# Patient Record
Sex: MALE | Race: BLACK OR AFRICAN AMERICAN
[De-identification: names, ages, dates, MRNs, and addresses within clinical notes are randomized per-mention and may not be internally consistent; named-entity substitution may affect disease eponyms.]

---

## 2017-01-06 ENCOUNTER — HOSPITAL ENCOUNTER (EMERGENCY)
Dept: HOSPITAL 18 - NAV ERS | Age: 42
Discharge: HOME | End: 2017-01-06
Payer: SELF-PAY

## 2017-01-06 DIAGNOSIS — S09.90XA: Primary | ICD-10-CM

## 2017-01-06 DIAGNOSIS — S01.01XA: ICD-10-CM

## 2017-01-06 DIAGNOSIS — F17.210: ICD-10-CM
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
## 2017-01-06 DIAGNOSIS — Z90.49: ICD-10-CM

## 2017-01-06 PROCEDURE — 70450 CT HEAD/BRAIN W/O DYE: CPT

## 2017-01-06 PROCEDURE — 87070 CULTURE OTHR SPECIMN AEROBIC: CPT

## 2017-01-06 PROCEDURE — 87205 SMEAR GRAM STAIN: CPT

## 2017-01-06 NOTE — ERRECORD
[facility]

                                EMERGENCY RECORD



HPI WOUND CHECK (18:29 SHAN)

CHIEF COMPLAINT: Patient presents for evaluation of scalp

      wound, laceration. HISTORIAN: History

      provided by patient, 2 by 4 fell aprox. 20 feet and struck him

      in the head; he relates that he was unconscious for several minutes;

      was seen at an urgent care in [place]. Injury occurred on the job. Had

      marked headache yesterday. Five sutures were placed in the wound.

      Today had more swelling and mild exudate, came in to recheck.

      TIME COURSE: Sudden onset of symptoms.

      ASSOCIATED WITH: No associated symptoms.



ROS (18:31 SHAN)

CONSTITUTIONAL: Negative constitutional review of systems.

EYES: Negative eye review of systems.

ENT: Negative ears, nose, throat review of systems.

CARDIOVASCULAR: Negative cardiovascular review of systems.

RESPIRATORY: Negative respiratory review of systems.

GI: Negative gastrointestinal review of systems.

MUSCULOSKELETAL: Negative musculoskeletal review of systems.

SKIN: Negative skin review of systems, laceration on left

      scalp.

NEUROLOGIC: Negative neurologic review of systems.

NOTES: All systems reviewed, negative except as described above.



PAST MEDICAL HISTORY (18:17 MSPE)

MEDICAL HISTORY:  No past medical history, Flu vaccine not

      up to date, Tetanus immunization up to date, Pneumococcal vaccine not

      up to date.

MALE SURGICAL HISTORY:  Surgical history of

      appendectomy.

PSYCHIATRIC HISTORY:  No previous psychiatric history.

SOCIAL HISTORY: Patient drinks socially, Patient denies drug use,

      Patient currently uses tobacco, smokes cigarettes, sts

      one pack every two wks.



KNOWN ALLERGIES

aspirin

No Known Allergies (Unconfirmed)



CURRENT MEDICATIONS (18:15 MSPE)

None



VITAL SIGNS

VITAL SIGNS: BP: 157/106, Pulse: 87, Resp: 18, Temp: 98.6 (Oral),

      Pain: 5, O2 sat: 98 on Room Air, Time: 2017 18:15. (18:15 MSPE)

  BP: 134/95, Pulse: 80, Resp: 16, Time: 2017 18:46. (18:46 MSPE)



PHYSICAL EXAM (18:31 SHAN)

CONSTITUTIONAL: Patient afebrile, Pulse normal, Blood pressure

      normal, Respiratory rate normal, Normal pulse oximetry, Patient



&a-1R&a+25V*p+0X*e3588B*c202B*c15G*c2P*p-0X&a-25V&a+1R          Name: Dharmesh Bailey  : 1975 M41 MedRec: [id number]

                             AcctNum: [id number]

  Prepared:  19:33 by Interface                 Page 1 of 3

                                      pMD

                         [facility]

                                EMERGENCY RECORD



      appears non toxic, Patient appears pain free, Patient alert and

      oriented to person, place and time, Nursing notes reviewed.

HEAD: Head exam included findings of, normocephalic,

      5 cm laceration with mild exudate on left occipital area.

      culture done. mild swelling.

EYES: Eye exam included findings of eyelids normal to inspection,

      Pupils equally round and reactive to light, Extraocular muscles

      intact.

ENT: Pharynx exam normal, Uvula exam normal, Tonsil exam normal.

NECK: Neck exam included findings of normal range of motion,

      Trachea midline.

RESPIRATORY CHEST: Respiratory and chest exam normal.

CARDIOVASCULAR: Cardiovascular exam included findings of heart

      rate regular rate and rhythm, Heart sounds normal.

ABDOMEN MALE: Abdominal exam included findings of abdomen

      nontender, Bowel sounds normal.

BACK: Back exam normal.

UPPER EXTREMITY: Upper extremity exam included findings of

      inspection normal, Range of motion normal.

NEURO: Neuro exam normal.

SKIN: Skin exam normal.



MEDICATION ADMINISTRATION SUMMARY



      Drug Name: Keflex, Dose Ordered: 2 cap(s), Route: Oral, Status:

      Given, Time: 18:52 2017, Detailed record available in Medication

      Service section.



DOCTOR NOTES (19:17 SHAN)

TEXT:  Question of early cellulitis; culture done; Keflex

      started, ct of head good.



PROBLEM LIST

  No recorded problems



DIAGNOSIS (19:19 SHAN)

FINAL: PRIMARY: scalp laceration followup, ADDITIONAL:

      closed head injury; 1 day old.



PRESCRIPTION (18:54 SHAN)

Keflex:  CAPSULE : 250 mg : ORAL : Quantity: *** 1 *** Unit:

      cap(s) Route: ORAL Schedule: 4 times a day Dispense: *** 28 *** Unit:

      cap(s)

      May substitute. Refills: *** No Refills ***.

NOTES:  No Refills.



DISPOSITION

PATIENT:  Disposition Type: Discharge, Disposition: *Discharge

      Home. (19:19 SHAN)

   Patient left the department. (19:29 EPIE)



&a-1R&a+25V*p+0X*y5075I*c202B*c15G*c2P*p-0X&a-25V&a+1R          Name: Dharmesh Bailey  : 1975 11 MedRec: [id number]

                             AcctNum: [id number]

  Prepared:  19:33 by Interface                 Page 2 of 3

                                      pMD

                         [facility]

                                EMERGENCY RECORD



Sandoval:

  NARCISO=SG Riggins, Damaris ALBA=SG Morrell, Cherri TUCKER=MD Janae, Rafael



































































































&a-1R&a+25V*p+0X*h1281O*c202B*c15G*c2P*p-0X&a-25V&a+1R          Name: Dharmesh Bailey  : 1975 11 MedRec: [id number]

                             AcctNum: [id number]

  Prepared:  19:33 by Interface                 Page 3 of 3

                                      pMD

MTDD

## 2017-01-06 NOTE — PICIS
[facility]

                                EMERGENCY RECORD



TRIAGE ( 18:14 MSPE)

TRIAGE NOTES:  head wound check with headache. ( 18:14 MSPE)

PATIENT: NAME: Dharmesh Bailey, AGE: 41, GENDER: male, : Tue

      Oct 04, 1975, TIME OF GREET:  18:11, PREFERRED

      LANGUAGE: English, ETHNICITY: Not  or , ECODE BILLING

      MAP: [facility], SSN: [id number], Zip Code: [address], [place]

      WEIGHT: 65.77, PHONE: [phone], MEDICAL RECORD NUMBER:

      [id number], ACCOUNT NUMBER: [id number], PERSON ID: [id number], PCP:

      none. ( 18:14 MSPE)

COMPLAINT: HIGH RISK COMPLAINT: HEAD INJURY. (

      18:14 MSPE)

ADMISSION: URGENCY: 4 Non Urgent, ADMISSION SOURCE: Home,

      TRANSPORT: CAR, BED: ER -04. ( 18:14 MSPE)

TREATMENTS IN PROGRESS: Treatments given Prehospital: none today.

      (18:17 MSPE)

PROVIDERS: TRIAGE NURSE: Cherri Morrell RN. (

      18:14 MSPE)

VITAL SIGNS: /106, Pulse 87, Resp 18, Temp 98.6, (Oral),

      Pain 5, O2 Sat 98, on Room Air, Time 2017 18:15. (18:15 MSPE)

PREVIOUS VISIT ALLERGIES: aspirin. ( 18:14 MSPE)

  aspirin. (18:17 MSPE)



KNOWN ALLERGIES

aspirin

No Known Allergies (Unconfirmed)



CURRENT MEDICATIONS (18:15 MSPE)

None



VITAL SIGNS

VITAL SIGNS: BP: 157/106, Pulse: 87, Resp: 18, Temp: 98.6 (Oral),

      Pain: 5, O2 sat: 98 on Room Air, Time: 2017 18:15. (18:15 MSPE)

  BP: 134/95, Pulse: 80, Resp: 16, Time: 2017 18:46. (18:46 MSPE)



NURSING ASSESSMENT: FOCUSED (18:20 MSPE)

CONSTITUTIONAL: Patient arrives ambulatory, Gait steady, History

      obtained from patient, Patient appears, anxious, Patient

      cooperative, Patient alert, Oriented to person, place and time, Skin

      warm, Skin dry.

PAIN:  c/o headache.

EYES: Focused eye assessment finding include pupils equally round

      and reactive to light.

NEURO: Focused neuro assessment findings include patient alert,

      cooperative, Speech coherent.

GCS: Eye opening: (4) - Spontaneous, Verbal: (5) -

      Oriented/conversive, Motor: (6) - Obeys commands/Spontaneous, GCS

      Total: 15.

RESPIRATORY: Notes: no resp distress.

ABDOMEN: no complaint of nausea.



&a-1R&a+25V*p+0X*n7510M*c202B*c15G*c2P*p-0X&a-25V&a+1R          Name: Dharmesh Bailey  : 1975 M41 MedRec: [id number]

                             AcctNum: [id number]

  Prepared:  19:39 by Interface                 Page 1 of 5

                                      pMD

                         [facility]

                                EMERGENCY RECORD



GENITOURINARY: Focused genitourinary assessment not applicable.

MUSCULOSKELETAL: Focused musculoskeletal assessment findings

      include normal range of motion.

LACERATION: Notes: approx. 3cm sutured laceration to top of

      head. Sutures intact. Scant amt of drainage noted. Pt reports a 2x4

      fell approx. 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 Was treated at medical

      facility near job site (in [facility]) Concerned today that wound

      "may need more stitches" and has had a persistent HA today. Denies

      N/V. Denies neck pain.



NURSING PROCEDURE: DISCHARGE NOTE (19:26 EPIE)

DISCHARGE: Patient discharged to home, ambulating without

      assistance, driving self, accompanied by other family member, Summary

      of Care printed/ provided, Discharge instructions given to patient,

      Simple or moderate discharge teaching performed, Prescriptions given

      and instructions on side effects given, Name of prescription(s)

      given: keflex, Above person(s) verbalized understanding of discharge

      instructions and follow-up care, Notes: Pt scalp wound covered

      with bacitracin and band aid.

BELONGINGS: Belongings and valuables with patient upon arrival to

      the Emergency Department include:, Belongings and valuables with

      patient at time of discharge include:, Belongings remain with

      patient, Valuables remain with patient.



NURSING PROCEDURE: NURSE NOTES

NURSES NOTES: Patient is awaiting results, Patient is awaiting

      disposition, Shift change report given, to Damaris HANSON RN, Provided

      opportunity to answer questions. (18:53 MSPE)

  Notes: Patient resting with RR even and unlabored. No new complaints

      at this time. Pt anxious at go home at this time. Awaiting ERMD to

      read CT report. (19:17 EPIE)



NURSING PROCEDURE: TRANSPORT TO TESTS

TRANSPORT TO TESTS: Patient transported to CT scan, ambulatory,

      Accompanied by x-ray technician. (18:38 MSPE)

  Patient transported to CT scan, via cart, Accompanied by x-ray

      technician. (18:36 CCRI)

FOLLOW-UP: After procedure, patient returned to emergency

      department. (18:44 MSPE)



ORDER DETAILS



      Order Name: CT Brain WO Con, Status: Active, Time: 18:29 2017,

      User: CAITLIN,

       - Ordered for: MD Cardoso Stanley,

       - Entered by: MD Cardoso Stanley - Fri 2017 18:29,

       - Quantity: 1,

      Order Name: Culture & GS, Body Fluid, Status: Active, Time: 18:28

      2017, User: SHAN,



&a-1R&a+25V*p+0X*l4622Q*c202B*c15G*c2P*p-0X&a-25V&a+1R          Name: Dharmesh Bailey  : 1975 M41 MedRec: [id number]

                             AcctNum: [id number]

  Prepared:  19:39 by Interface                 Page 2 of 5

                                      pMD

                         [facility]

                                EMERGENCY RECORD



       - Ordered for: MD Cardoso Stanley,

       - Entered by: MD Cardoso Stanley - Fri 2017 18:28,

       - Quantity: 1.



MEDICATION ADMINISTRATION SUMMARY



      Drug Name: Keflex, Dose Ordered: 2 cap(s), Route: Oral, Status:

      Given, Time: 18:52 2017, Detailed record available in Medication

      Service section.



MEDICATION SERVICE (18:52 CAITLIN)

Keflex:  Order: Keflex (cephalexin monohydrate) - Dose:

      2 cap(s) : Oral

      Schedule: Now

      Ordered by: Rafael Cardoso MD

      Entered by: Rafael Cardoso MD  18:48 ,

      Acknowledged by: Cherri Morrell RN  18:49

      Documented as given by: Cherri Morrell RN  18:52

      Patient, Medication, Dose, Route and Time verified prior to

      administration.

       Amount given: 1000mg, Site: Medication administered P.O., Patient

      appears Awake and alert- acceptable, Correct patient, time, route,

      dose and medication confirmed prior to administration, Patient

      advised of actions and side-effects prior to administration,

      Allergies confirmed and medications reviewed prior to administration,

      Patient in position of comfort, Side rails up, Cart in lowest

      position.



HPI WOUND CHECK (18:29 SHAN)

CHIEF COMPLAINT: Patient presents for evaluation of scalp

      wound, laceration. HISTORIAN: History

      provided by patient, 2 by 4 fell aprox. 20 feet and struck him

      in the head; he relates that he was unconscious for several minutes;

      was seen at an urgent care in [place]. Injury occurred on the job. Had

      marked headache yesterday. Five sutures were placed in the wound.

      Today had more swelling and mild exudate, came in to recheck.

      TIME COURSE: Sudden onset of symptoms.

      ASSOCIATED WITH: No associated symptoms.



ROS (18:31 SHAN)

CONSTITUTIONAL: Negative constitutional review of systems.

EYES: Negative eye review of systems.

ENT: Negative ears, nose, throat review of systems.

CARDIOVASCULAR: Negative cardiovascular review of systems.

RESPIRATORY: Negative respiratory review of systems.

GI: Negative gastrointestinal review of systems.

MUSCULOSKELETAL: Negative musculoskeletal review of systems.

SKIN: Negative skin review of systems, laceration on left

      scalp.

NEUROLOGIC: Negative neurologic review of systems.



&a-1R&a+25V*p+0X*h0310H*c202B*c15G*c2P*p-0X&a-25V&a+1R          Name: Dharmesh Bailey  : 1975 M41 MedRec: [id number]

                             AcctNum: [id number]

  Prepared:  19:39 by Interface                 Page 3 of 5

                                      pMD

                         [facility]

                                EMERGENCY RECORD



NOTES: All systems reviewed, negative except as described above.



PAST MEDICAL HISTORY (18:17 MSPE)

MEDICAL HISTORY:  No past medical history, Flu vaccine not

      up to date, Tetanus immunization up to date, Pneumococcal vaccine not

      up to date.

MALE SURGICAL HISTORY:  Surgical history of

      appendectomy.

PSYCHIATRIC HISTORY:  No previous psychiatric history.

SOCIAL HISTORY: Patient drinks socially, Patient denies drug use,

      Patient currently uses tobacco, smokes cigarettes, sts

      one pack every two wks.



PHYSICAL EXAM (18:31 SHAN)

CONSTITUTIONAL: Patient afebrile, Pulse normal, Blood pressure

      normal, Respiratory rate normal, Normal pulse oximetry, Patient

      appears non toxic, Patient appears pain free, Patient alert and

      oriented to person, place and time, Nursing notes reviewed.

HEAD: Head exam included findings of, normocephalic,

      5 cm laceration with mild exudate on left occipital area.

      culture done. mild swelling.

EYES: Eye exam included findings of eyelids normal to inspection,

      Pupils equally round and reactive to light, Extraocular muscles

      intact.

ENT: Pharynx exam normal, Uvula exam normal, Tonsil exam normal.

NECK: Neck exam included findings of normal range of motion,

      Trachea midline.

RESPIRATORY CHEST: Respiratory and chest exam normal.

CARDIOVASCULAR: Cardiovascular exam included findings of heart

      rate regular rate and rhythm, Heart sounds normal.

ABDOMEN MALE: Abdominal exam included findings of abdomen

      nontender, Bowel sounds normal.

BACK: Back exam normal.

UPPER EXTREMITY: Upper extremity exam included findings of

      inspection normal, Range of motion normal.

NEURO: Neuro exam normal.

SKIN: Skin exam normal.



EVENTS

TRANSFER:  Triage to Emergency Emergency Room -04. ( 18:14 MSPE)

   Removed from Emergency Emergency Room -04. (19:29 EPIE)



DOCTOR NOTES (19:17 SHAN)

TEXT:  Question of early cellulitis; culture done; Keflex

      started, ct of head good.



PROBLEM LIST

  No recorded problems





&a-1R&a+25V*p+0X*a0721K*c202B*c15G*c2P*p-0X&a-25V&a+1R          Name: Dharmesh Bailey  : 1975 M41 MedRec: [id number]

                             AcctNum: [id number]

  Prepared:  19:39 by Interface                 Page 4 of 5

                                      pMD

                         [facility]

                                EMERGENCY RECORD



DIAGNOSIS (19:19 SHAN)

FINAL: PRIMARY: scalp laceration followup, ADDITIONAL:

      closed head injury; 1 day old.



DISPOSITION

PATIENT:  Disposition Type: Discharge, Disposition: *Discharge

      Home. (19:19 SHAN)

   Patient left the department. (19:29 EPIE)



INSTRUCTION (18:55 SHAN)

DISCHARGE:  LACERATION, SCALP, CLOSED HEAD INJURY NO WAKEUP

      ADULT.

SPECIAL:  1. antibiotic as directed

      2. plan on sutures out in about 7 days

      3. follow head trauma instructions

      4. return if any problems.



PRESCRIPTION (18:54 SHAN)

Keflex:  CAPSULE : 250 mg : ORAL : Quantity: *** 1 *** Unit:

      cap(s) Route: ORAL Schedule: 4 times a day Dispense: *** 28 *** Unit:

      cap(s)

      May substitute. Refills: *** No Refills ***.

NOTES:  No Refills.



IMAGING (19:28 EPIE)

*DISCHARGE INSTRUCTIONS RECEIPT:  Image captured from scanner.

*SUPPLY CHARGE SHEET:  Image captured from scanner.



ADMIN (19:19 CAITLIN)

DIGITAL SIGNATURE:  MD Cardoso Stanley.

Key:

  CCRI=Sloan, RAD, Frank EPIE=SG Riggins, Damaris MSPE=SG Morrell, Cherri TUCKER=MD Cardoso Stanley





































&a-1R&a+25V*p+0X*j3039K*c202B*c15G*c2P*p-0X&a-25V&a+1R          Name: Dharmesh Bailey  : 1975 M41 MedRec: [id number]

                             AcctNum: [id number]

  Prepared:  19:39 by Interface                 Page 5 of 5

                                      pMD

MTDD

## 2017-01-06 NOTE — CT
EXAM:

NONCONTRAST HEAD CT

1/6/17

 

COMPARISON:  

6/4/12

 

HISTORY: 

Patient states he was knocked out by a 2x4. 

 

TECHNIQUE:  

Noncontrast head CT is performed from skull base to skull vertex. 

 

FINDINGS:  

No parenchymal hemorrhage. No extra-axial hematoma. No midline shift. Basilar cisterns are patent. B
rain volume, age appropriate. Cortical gray-white matter differentiation is preserved. The ventricle
s and sulci are patent and symmetric. 

 

The calvarium is intact. Adequate aeration of the sinuses and mastoid air cells. 

 

IMPRESSION:  

No intracranial posttraumatic sequela. 

 

POS: PPP

## 2017-01-12 ENCOUNTER — HOSPITAL ENCOUNTER (EMERGENCY)
Dept: HOSPITAL 18 - NAV ERS | Age: 42
Discharge: HOME | End: 2017-01-12
Payer: SELF-PAY

## 2017-01-12 DIAGNOSIS — Z48.02: Primary | ICD-10-CM

## 2017-01-12 PROCEDURE — 99281 EMR DPT VST MAYX REQ PHY/QHP: CPT

## 2017-01-12 NOTE — ERRECORD
[facility]

                                EMERGENCY RECORD



HPI WOUND CHECK (11:43 BLEW)

CHIEF COMPLAINT: Patient presents for evaluation of scalp

      wound. HISTORIAN: History provided by patient,

      Patient had sutures placed 7 days ago after being hit in head

      with a beam at work. No HA. No problems since. Wound healing well

      with no drainage or pain. LOCATION: Symptoms are

      localized.



ROS (11:47 BLEW)

CONSTITUTIONAL: Negative constitutional review of systems.

EYES: Negative eye review of systems.

ENT: Negative ears, nose, throat review of systems.

CARDIOVASCULAR: Negative cardiovascular review of systems.

RESPIRATORY: Negative respiratory review of systems.

SKIN: Negative skin review of systems.



KNOWN ALLERGIES

aspirin

No Known Allergies (Unconfirmed)



CURRENT MEDICATIONS

  No recorded medications



VITAL SIGNS (11:40 GHIA)

VITAL SIGNS: BP: 137/107, Pulse: 78, Resp: 17, Temp: 98.5 (Oral),

      Pain: 0, O2 sat: 98 on Room Air, Time: 2017 11:40.



PHYSICAL EXAM (11:45 BLEW)

CONSTITUTIONAL: Vital signs reviewed.

HEAD: Head exam normal, normocephalic, wound well healed. No

      erythema or drainage. Non tender.

EYES: Eye exam included findings of eyelids normal to inspection,

      Pupils equally round and reactive to light.

ENT: ENT exam normal.

RESPIRATORY CHEST: Respiratory and chest exam normal.

CARDIOVASCULAR: Cardiovascular assessment normal.

SKIN: Skin exam normal.

PSYCHIATRIC: Psychiatric exam included findings of patient

      oriented to person place and time, Normal affect, Judgment normal,

      Remote memory normal.



PROBLEM LIST

  No recorded problems



DIAGNOSIS (11:46 BLEW)

FINAL: PRIMARY: ENCOUNTER FOR REMOVAL OF SUTURES.



PRESCRIPTION

  No recorded prescriptions





&a-1R&a+25V*p+0X*x1653S*c202B*c15G*c2P*p-0X&a-25V&a+1R          Name: Dharmesh Bailey MARYSOL  : 1975 M41 MedRec: [id number]

                             AcctNum: [id number]

  Prepared: Thu 2017 16:15 by Interface                 Page 1 of 2

                                      pMD

                         [facility]

                                EMERGENCY RECORD



DISPOSITION

PATIENT:  Disposition Type: Discharge, Disposition: *Discharge

      Home. (11:46 BLEW)

   Patient left the department. (11:50 GHIA)

Sandoval:

  BLEW=DO Ga Brandon GHIA=SG Velez, Lissette



























































































&a-1R&a+25V*p+0X*i3000M*c202B*c15G*c2P*p-0X&a-25V&a+1R          Name: Dharmesh Bailey  : 1975 M41 MedRec: [id number]

                             AcctNum: [id number]

  Prepared: Thu 2017 16:15 by Interface                 Page 2 of 2

                                      pMD

MTDD

## 2017-01-12 NOTE — PICIS
[facility]

                                EMERGENCY RECORD



TRIAGE (11:40 [facility]

PATIENT: NAME: Dharmesh Bailey, AGE: 41, GENDER: male, : Tue

      Oct 04, 1975, TIME OF GREET:  11:36, PREFERRED

      LANGUAGE: English, RACE: Black or , ETHNICITY: Not

       or , ECODE BILLING MAP: [facility] ER,

      SSN: [id number], Zip Code: [address], KG WEIGHT: [phone], PHONE:

      [phone], MEDICAL RECORD NUMBER: [id number], ACCOUNT NUMBER:

      [id number], PERSON ID: [id number].

TRIAGE NOTES:  Pt requesting suture removal that was

      inserted 7 days ago.

COMPLAINT:  SUTURE REMOVAL//HEA.

ADMISSION: URGENCY: 5 Fast Track, ADMISSION SOURCE: Home,

      TRANSPORT: CAR, BED: TRIAGE.

PROVIDERS: TRIAGE NURSE: Lissette Velez RN.

PREVIOUS VISIT ALLERGIES: aspirin.



KNOWN ALLERGIES

aspirin

No Known Allergies (Unconfirmed)



CURRENT MEDICATIONS

  No recorded medications



VITAL SIGNS (11:40 GHIA)

VITAL SIGNS: BP: 137/107, Pulse: 78, Resp: 17, Temp: 98.5 (Oral),

      Pain: 0, O2 sat: 98 on Room Air, Time: 2017 11:40.



NURSING ASSESSMENT: HEAD-TO-TOE (11:41 IA)

CONSTITUTIONAL: Patient arrives ambulatory, Gait steady, History

      obtained from patient, Patient appears comfortable, Patient

      cooperative, Patient alert, Oriented to person, place and time, Skin

      warm, Skin dry, Skin normal in color, Mucous membranes pink, Mucous

      membranes moist, Patient is well-groomed, Patient complains of suture

      removal, Pt in room in bed . Assess. Plan of care of pt in Er

      discussed.

PAIN:  pt denies pain.

SKIN: Skin assessment findings include skin warm, Skin dry, Skin

      normal in color, Inspection findings include laceration, to

      top of head, sutures intact; no redness; no

      edema, Notes: intact.

RESPIRATORY/CHEST: Respiratory assessment findings include

      respiratory effort easy.

CARDIOVASCULAR: Cardiovascular assessment findings include heart

      rate normal.

ABDOMEN: Abdomen assessment findings include abdomen symmetrical,

      no associated nausea, no associated vomiting, no associated diarrhea,

      no associated constipation.

GENITOURINARY MALE: no associated urinary complaints.

NOTES: Emotional support needed and given, Patient tolerated

      procedure well.



&a-1R&a+25V*p+0X*k7186T*c202B*c15G*c2P*p-0X&a-25V&a+1R          Name: Dharmesh Bailey  : 1975 M41 MedRec: [id number]

                             AcctNum: [id number]

  Prepared: Thu 2017 16:21 by Interface                 Page 1 of 3

                                      pMD

                         [facility]

                                EMERGENCY RECORD



SAFETY: Side rails up, Cart/Stretcher in lowest position, Call

      light within reach, Hospital ID band on.



NURSING PROCEDURE: DISCHARGE NOTE (11:46 GHIA)

DISCHARGE: Patient discharged to home, ambulating without

      assistance, patient walking, unaccompanied, Summary of Care printed/

      provided, Transition record given to patient, Discharge instructions

      given to patient, Simple or moderate discharge teaching performed,

      Above person(s) verbalized understanding of discharge instructions

      and follow-up care.

BELONGINGS: Belongings remain with patient, Valuables remain with

      patient.

NOTES: Patient tolerated procedure well.



NURSING PROCEDURE: NURSE NOTES (11:43 GHIA)

NURSES NOTES: Notes: Er Dr Ga at bedside and removed

      sutures...see Dr aG notes.



HPI WOUND CHECK (11:43 BLEW)

CHIEF COMPLAINT: Patient presents for evaluation of scalp

      wound. HISTORIAN: History provided by patient,

      Patient had sutures placed 7 days ago after being hit in head

      with a beam at work. No HA. No problems since. Wound healing well

      with no drainage or pain. LOCATION: Symptoms are

      localized.



ROS (11:47 BLEW)

CONSTITUTIONAL: Negative constitutional review of systems.

EYES: Negative eye review of systems.

ENT: Negative ears, nose, throat review of systems.

CARDIOVASCULAR: Negative cardiovascular review of systems.

RESPIRATORY: Negative respiratory review of systems.

SKIN: Negative skin review of systems.



PHYSICAL EXAM (11:45 BLEW)

CONSTITUTIONAL: Vital signs reviewed.

HEAD: Head exam normal, normocephalic, wound well healed. No

      erythema or drainage. Non tender.

EYES: Eye exam included findings of eyelids normal to inspection,

      Pupils equally round and reactive to light.

ENT: ENT exam normal.

RESPIRATORY CHEST: Respiratory and chest exam normal.

CARDIOVASCULAR: Cardiovascular assessment normal.

SKIN: Skin exam normal.

PSYCHIATRIC: Psychiatric exam included findings of patient

      oriented to person place and time, Normal affect, Judgment normal,

      Remote memory normal.



EVENTS

TRANSFER:  Triage to Emergency Triage. ( 11:40



&a-1R&a+25V*p+0X*v3634O*c202B*c15G*c2P*p-0X&a-25V&a+1R          Name: Dharmesh Bailey  : 1975 M5 MedRec: [id number]

                             AcctNum: [id number]

  Prepared:  16:21 by Interface                 Page 2 of 3

                                      pMD

                         [facility]

                                EMERGENCY RECORD



      GHIA)

   Removed from Emergency Triage. (11:50 GHIA)



SUTURE/STAPLE REMOVAL (11:46 BLEW)

SUTURE/STAPLE REMOVAL: Side and/or site verified, Verbal consent

      obtained, Suture or staple removal indicated for scheduled removal,

      Suture(s) removed from laceration, to scalp, Wound age 7 days, No

      drainage present, Suture(s) removed without difficulty, Patient

      tolerated the procedure well.



PROBLEM LIST

  No recorded problems



DIAGNOSIS (11:46 BLEW)

FINAL: PRIMARY: ENCOUNTER FOR REMOVAL OF SUTURES.



DISPOSITION

PATIENT:  Disposition Type: Discharge, Disposition: *Discharge

      Home. (11:46 BLEW)

   Patient left the department. (11:50 GHIA)



INSTRUCTION (11:46 BLEW)

DISCHARGE:  SUTURE REMOVAL, NO COMPLICATION.

SPECIAL:  Call your doctor or return with worsening or worrisome

      symptoms.



PRESCRIPTION

  No recorded prescriptions



IMAGING

*SUPPLY CHARGE SHEET:  Image captured from scanner. (12:38 GHIA)

DISCHAR:  Image captured from scanner. (12:39 GHIA)



ADMIN (16:12 BLEW)

DIGITAL SIGNATURE:  DO Ga Brandon.

[place]:

  BLEW=DO Ga Brandon  GHIA=SG Velez, Lissette





























&a-1R&a+25V*p+0X*g3600R*c202B*c15G*c2P*p-0X&a-25V&a+1R          Name: Dharmesh Bailey  : 1975 M41 MedRec: [id number]

                             AcctNum: [id number]

  Prepared: Thu 2017 16:21 by Interface                 Page 3 of 3

                                      pMD

MTDD

## 2017-03-30 ENCOUNTER — HOSPITAL ENCOUNTER (EMERGENCY)
Dept: HOSPITAL 18 - NAV ERS | Age: 42
Discharge: HOME | End: 2017-03-30
Payer: SELF-PAY

## 2017-03-30 DIAGNOSIS — F17.200: ICD-10-CM

## 2017-03-30 DIAGNOSIS — R59.0: Primary | ICD-10-CM

## 2017-03-30 PROCEDURE — 99283 EMERGENCY DEPT VISIT LOW MDM: CPT

## 2017-04-18 ENCOUNTER — HOSPITAL ENCOUNTER (EMERGENCY)
Dept: HOSPITAL 18 - NAV ERS | Age: 42
Discharge: HOME | End: 2017-04-18
Payer: SELF-PAY

## 2017-04-18 DIAGNOSIS — Z79.2: ICD-10-CM

## 2017-04-18 DIAGNOSIS — Z79.899: ICD-10-CM

## 2017-04-18 DIAGNOSIS — G44.309: Primary | ICD-10-CM

## 2017-04-18 DIAGNOSIS — F17.200: ICD-10-CM

## 2017-04-18 PROCEDURE — 99283 EMERGENCY DEPT VISIT LOW MDM: CPT

## 2017-09-07 ENCOUNTER — HOSPITAL ENCOUNTER (EMERGENCY)
Dept: HOSPITAL 18 - NAV ERS | Age: 42
Discharge: HOME | End: 2017-09-07
Payer: SELF-PAY

## 2017-09-07 DIAGNOSIS — J02.9: Primary | ICD-10-CM

## 2017-09-07 DIAGNOSIS — F17.200: ICD-10-CM

## 2017-09-07 PROCEDURE — 87430 STREP A AG IA: CPT

## 2017-09-07 PROCEDURE — 87081 CULTURE SCREEN ONLY: CPT

## 2017-09-07 PROCEDURE — 99283 EMERGENCY DEPT VISIT LOW MDM: CPT

## 2018-07-18 ENCOUNTER — HOSPITAL ENCOUNTER (EMERGENCY)
Dept: HOSPITAL 18 - NAV ERS | Age: 43
Discharge: HOME | End: 2018-07-18
Payer: COMMERCIAL

## 2018-07-18 DIAGNOSIS — E86.0: Primary | ICD-10-CM

## 2018-07-18 DIAGNOSIS — F17.200: ICD-10-CM

## 2018-07-18 LAB
ALBUMIN SERPL BCG-MCNC: 4.1 G/DL (ref 3.5–5)
ALP SERPL-CCNC: 65 U/L (ref 40–150)
ALT SERPL W P-5'-P-CCNC: 16 U/L (ref 8–55)
ANION GAP SERPL CALC-SCNC: 15 MMOL/L (ref 10–20)
AST SERPL-CCNC: 23 U/L (ref 5–34)
BASOPHILS # BLD AUTO: 0.1 THOU/UL (ref 0–0.2)
BASOPHILS NFR BLD AUTO: 1.5 % (ref 0–1)
BILIRUB SERPL-MCNC: 0.9 MG/DL (ref 0.2–1.2)
BUN SERPL-MCNC: 16 MG/DL (ref 8.9–20.6)
CALCIUM SERPL-MCNC: 9.7 MG/DL (ref 7.8–10.44)
CHLORIDE SERPL-SCNC: 105 MMOL/L (ref 98–107)
CK SERPL-CCNC: 186 U/L (ref 30–200)
CO2 SERPL-SCNC: 21 MMOL/L (ref 22–29)
CREAT CL PREDICTED SERPL C-G-VRATE: 0 ML/MIN (ref 70–130)
DRUG SCREEN CUTOFF: (no result)
EOSINOPHIL # BLD AUTO: 0.1 THOU/UL (ref 0–0.7)
EOSINOPHIL NFR BLD AUTO: 2.2 % (ref 0–10)
GLOBULIN SER CALC-MCNC: 3.3 G/DL (ref 2.4–3.5)
GLUCOSE SERPL-MCNC: 115 MG/DL (ref 70–105)
HGB BLD-MCNC: 15.5 G/DL (ref 14–18)
LYMPHOCYTES # BLD AUTO: 1.8 THOU/UL (ref 1.2–3.4)
LYMPHOCYTES NFR BLD AUTO: 31.7 % (ref 21–51)
MCH RBC QN AUTO: 28.3 PG (ref 27–31)
MCV RBC AUTO: 88.5 FL (ref 78–98)
MEDTOX CONTROL LINE VALID?: (no result)
MONOCYTES # BLD AUTO: 0.5 THOU/UL (ref 0.11–0.59)
MONOCYTES NFR BLD AUTO: 9.3 % (ref 0–10)
NEUTROPHILS # BLD AUTO: 3.2 THOU/UL (ref 1.4–6.5)
NEUTROPHILS NFR BLD AUTO: 55.3 % (ref 42–75)
PLATELET # BLD AUTO: 172 THOU/UL (ref 130–400)
POTASSIUM SERPL-SCNC: 3.6 MMOL/L (ref 3.5–5.1)
RBC # BLD AUTO: 5.47 MILL/UL (ref 4.7–6.1)
SODIUM SERPL-SCNC: 137 MMOL/L (ref 136–145)
SP GR UR STRIP: 1 (ref 1–1.04)
WBC # BLD AUTO: 5.8 THOU/UL (ref 4.8–10.8)

## 2018-07-18 PROCEDURE — 36416 COLLJ CAPILLARY BLOOD SPEC: CPT

## 2018-07-18 PROCEDURE — 96374 THER/PROPH/DIAG INJ IV PUSH: CPT

## 2018-07-18 PROCEDURE — 80306 DRUG TEST PRSMV INSTRMNT: CPT

## 2018-07-18 PROCEDURE — 96361 HYDRATE IV INFUSION ADD-ON: CPT

## 2018-07-18 PROCEDURE — 93005 ELECTROCARDIOGRAM TRACING: CPT

## 2018-07-18 PROCEDURE — 81003 URINALYSIS AUTO W/O SCOPE: CPT

## 2018-07-18 PROCEDURE — 80053 COMPREHEN METABOLIC PANEL: CPT

## 2018-07-18 PROCEDURE — 85025 COMPLETE CBC W/AUTO DIFF WBC: CPT

## 2018-07-18 PROCEDURE — 82550 ASSAY OF CK (CPK): CPT

## 2020-01-27 ENCOUNTER — HOSPITAL ENCOUNTER (EMERGENCY)
Dept: HOSPITAL 18 - NAV ERS | Age: 45
Discharge: HOME | End: 2020-01-27
Payer: COMMERCIAL

## 2020-01-27 DIAGNOSIS — F17.210: ICD-10-CM

## 2020-01-27 DIAGNOSIS — K08.89: Primary | ICD-10-CM

## 2020-01-27 PROCEDURE — 99282 EMERGENCY DEPT VISIT SF MDM: CPT

## 2020-09-09 ENCOUNTER — HOSPITAL ENCOUNTER (EMERGENCY)
Dept: HOSPITAL 18 - NAV ERS | Age: 45
Discharge: HOME | End: 2020-09-09
Payer: SELF-PAY

## 2020-09-09 DIAGNOSIS — S61.240A: Primary | ICD-10-CM

## 2020-09-09 DIAGNOSIS — F17.210: ICD-10-CM
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
## 2020-09-09 DIAGNOSIS — L03.011: ICD-10-CM

## 2020-09-09 PROCEDURE — 87070 CULTURE OTHR SPECIMN AEROBIC: CPT

## 2020-09-09 PROCEDURE — 87205 SMEAR GRAM STAIN: CPT

## 2020-09-09 PROCEDURE — 10120 INC&RMVL FB SUBQ TISS SMPL: CPT

## 2021-04-05 ENCOUNTER — HOSPITAL ENCOUNTER (EMERGENCY)
Dept: HOSPITAL 92 - CSHERS | Age: 46
Discharge: HOME | End: 2021-04-05
Payer: SELF-PAY

## 2021-04-05 ENCOUNTER — HOSPITAL ENCOUNTER (EMERGENCY)
Dept: HOSPITAL 18 - NAV ERS | Age: 46
Discharge: ANOTHER HOSPITAL | End: 2021-04-05
Payer: SELF-PAY

## 2021-04-05 DIAGNOSIS — N50.812: Primary | ICD-10-CM

## 2021-04-05 DIAGNOSIS — N50.89: Primary | ICD-10-CM

## 2021-04-05 DIAGNOSIS — F17.210: ICD-10-CM

## 2021-04-05 LAB
ALBUMIN SERPL BCG-MCNC: 4 G/DL (ref 3.5–5)
ALP SERPL-CCNC: 69 U/L (ref 40–110)
ALT SERPL W P-5'-P-CCNC: 13 U/L (ref 8–55)
ANION GAP SERPL CALC-SCNC: 16 MMOL/L (ref 10–20)
AST SERPL-CCNC: 19 U/L (ref 5–34)
BASOPHILS # BLD AUTO: 0.1 THOU/UL (ref 0–0.2)
BASOPHILS NFR BLD AUTO: 1.4 % (ref 0–1)
BILIRUB SERPL-MCNC: 0.4 MG/DL (ref 0.2–1.2)
BUN SERPL-MCNC: 11 MG/DL (ref 8.9–20.6)
CALCIUM SERPL-MCNC: 9.9 MG/DL (ref 7.8–10.44)
CHLORIDE SERPL-SCNC: 104 MMOL/L (ref 98–107)
CO2 SERPL-SCNC: 20 MMOL/L (ref 22–29)
CREAT CL PREDICTED SERPL C-G-VRATE: 0 ML/MIN (ref 70–130)
EOSINOPHIL # BLD AUTO: 0.2 THOU/UL (ref 0–0.7)
EOSINOPHIL NFR BLD AUTO: 2.2 % (ref 0–10)
GLOBULIN SER CALC-MCNC: 3.1 G/DL (ref 2.4–3.5)
GLUCOSE SERPL-MCNC: 73 MG/DL (ref 70–105)
HGB BLD-MCNC: 15.5 G/DL (ref 14–18)
LYMPHOCYTES # BLD AUTO: 2.5 THOU/UL (ref 1.2–3.4)
LYMPHOCYTES NFR BLD AUTO: 34.6 % (ref 21–51)
MCH RBC QN AUTO: 29.7 PG (ref 27–31)
MCV RBC AUTO: 93.3 FL (ref 78–98)
MONOCYTES # BLD AUTO: 0.4 THOU/UL (ref 0.11–0.59)
MONOCYTES NFR BLD AUTO: 5.5 % (ref 0–10)
NEUTROPHILS # BLD AUTO: 4.1 THOU/UL (ref 1.4–6.5)
NEUTROPHILS NFR BLD AUTO: 56.3 % (ref 42–75)
PLATELET # BLD AUTO: 239 THOU/UL (ref 130–400)
POTASSIUM SERPL-SCNC: 3.7 MMOL/L (ref 3.5–5.1)
RBC # BLD AUTO: 5.23 MILL/UL (ref 4.7–6.1)
SODIUM SERPL-SCNC: 136 MMOL/L (ref 136–145)
WBC # BLD AUTO: 7.2 THOU/UL (ref 4.8–10.8)

## 2021-04-05 PROCEDURE — 81003 URINALYSIS AUTO W/O SCOPE: CPT

## 2021-04-05 PROCEDURE — 96375 TX/PRO/DX INJ NEW DRUG ADDON: CPT

## 2021-04-05 PROCEDURE — 80053 COMPREHEN METABOLIC PANEL: CPT

## 2021-04-05 PROCEDURE — 76870 US EXAM SCROTUM: CPT

## 2021-04-05 PROCEDURE — 85025 COMPLETE CBC W/AUTO DIFF WBC: CPT

## 2021-04-05 PROCEDURE — 93976 VASCULAR STUDY: CPT

## 2021-04-05 PROCEDURE — 96374 THER/PROPH/DIAG INJ IV PUSH: CPT

## 2021-05-16 ENCOUNTER — HOSPITAL ENCOUNTER (EMERGENCY)
Dept: HOSPITAL 18 - NAV ERS | Age: 46
Discharge: HOME | End: 2021-05-16
Payer: SELF-PAY

## 2021-05-16 DIAGNOSIS — F17.210: ICD-10-CM

## 2021-05-16 DIAGNOSIS — N13.30: Primary | ICD-10-CM

## 2021-05-16 LAB
ALBUMIN SERPL BCG-MCNC: 3.9 G/DL (ref 3.5–5)
ALP SERPL-CCNC: 66 U/L (ref 40–110)
ALT SERPL W P-5'-P-CCNC: 15 U/L (ref 8–55)
ANION GAP SERPL CALC-SCNC: 13 MMOL/L (ref 10–20)
AST SERPL-CCNC: 20 U/L (ref 5–34)
BASOPHILS # BLD AUTO: 0.1 THOU/UL (ref 0–0.2)
BASOPHILS NFR BLD AUTO: 1.4 % (ref 0–1)
BILIRUB SERPL-MCNC: 0.6 MG/DL (ref 0.2–1.2)
BUN SERPL-MCNC: 12 MG/DL (ref 8.9–20.6)
CALCIUM SERPL-MCNC: 9.8 MG/DL (ref 7.8–10.44)
CHLORIDE SERPL-SCNC: 100 MMOL/L (ref 98–107)
CO2 SERPL-SCNC: 29 MMOL/L (ref 22–29)
CREAT CL PREDICTED SERPL C-G-VRATE: 0 ML/MIN (ref 70–130)
DRUG SCREEN CUTOFF: (no result)
EOSINOPHIL # BLD AUTO: 0.3 THOU/UL (ref 0–0.7)
EOSINOPHIL NFR BLD AUTO: 4.3 % (ref 0–10)
GLOBULIN SER CALC-MCNC: 3.2 G/DL (ref 2.4–3.5)
GLUCOSE SERPL-MCNC: 107 MG/DL (ref 70–105)
HGB BLD-MCNC: 15.4 G/DL (ref 14–18)
LYMPHOCYTES # BLD AUTO: 2.7 THOU/UL (ref 1.2–3.4)
LYMPHOCYTES NFR BLD AUTO: 35 % (ref 21–51)
MCH RBC QN AUTO: 29.5 PG (ref 27–31)
MCV RBC AUTO: 95.3 FL (ref 78–98)
MEDTOX CONTROL LINE VALID?: (no result)
MONOCYTES # BLD AUTO: 0.5 THOU/UL (ref 0.11–0.59)
MONOCYTES NFR BLD AUTO: 6.1 % (ref 0–10)
NEUTROPHILS # BLD AUTO: 4.1 THOU/UL (ref 1.4–6.5)
NEUTROPHILS NFR BLD AUTO: 53.2 % (ref 42–75)
PLATELET # BLD AUTO: 230 THOU/UL (ref 130–400)
POTASSIUM SERPL-SCNC: 3.8 MMOL/L (ref 3.5–5.1)
RBC # BLD AUTO: 5.24 MILL/UL (ref 4.7–6.1)
SODIUM SERPL-SCNC: 138 MMOL/L (ref 136–145)
SP GR UR STRIP: 1.01 (ref 1–1.03)
WBC # BLD AUTO: 7.7 THOU/UL (ref 4.8–10.8)

## 2021-05-16 PROCEDURE — 81003 URINALYSIS AUTO W/O SCOPE: CPT

## 2021-05-16 PROCEDURE — 96374 THER/PROPH/DIAG INJ IV PUSH: CPT

## 2021-05-16 PROCEDURE — 85025 COMPLETE CBC W/AUTO DIFF WBC: CPT

## 2021-05-16 PROCEDURE — 80306 DRUG TEST PRSMV INSTRMNT: CPT

## 2021-05-16 PROCEDURE — 80053 COMPREHEN METABOLIC PANEL: CPT

## 2021-05-16 PROCEDURE — 74177 CT ABD & PELVIS W/CONTRAST: CPT

## 2021-06-28 ENCOUNTER — HOSPITAL ENCOUNTER (EMERGENCY)
Dept: HOSPITAL 18 - NAV ERS | Age: 46
Discharge: LEFT BEFORE DISCHARGE | End: 2021-06-28
Payer: COMMERCIAL

## 2021-06-28 DIAGNOSIS — Z53.21: Primary | ICD-10-CM

## 2021-10-12 ENCOUNTER — HOSPITAL ENCOUNTER (EMERGENCY)
Dept: HOSPITAL 18 - NAV ERS | Age: 46
Discharge: ANOTHER HOSPITAL | End: 2021-10-12
Payer: COMMERCIAL

## 2021-10-12 DIAGNOSIS — R10.32: ICD-10-CM

## 2021-10-12 DIAGNOSIS — N50.811: Primary | ICD-10-CM

## 2021-10-12 DIAGNOSIS — N50.812: ICD-10-CM

## 2021-10-12 DIAGNOSIS — R10.814: ICD-10-CM

## 2021-10-12 DIAGNOSIS — F17.210: ICD-10-CM

## 2021-10-12 LAB
ALBUMIN SERPL BCG-MCNC: 3.8 G/DL (ref 3.5–5)
ALP SERPL-CCNC: 64 U/L (ref 40–110)
ALT SERPL W P-5'-P-CCNC: 12 U/L (ref 8–55)
ANION GAP SERPL CALC-SCNC: 15 MMOL/L (ref 10–20)
AST SERPL-CCNC: 17 U/L (ref 5–34)
BASOPHILS # BLD AUTO: 0 THOU/UL (ref 0–0.2)
BASOPHILS NFR BLD AUTO: 0.8 % (ref 0–1)
BILIRUB SERPL-MCNC: 1 MG/DL (ref 0.2–1.2)
BUN SERPL-MCNC: 12 MG/DL (ref 8.9–20.6)
CALCIUM SERPL-MCNC: 9.4 MG/DL (ref 7.8–10.44)
CHLORIDE SERPL-SCNC: 108 MMOL/L (ref 98–107)
CO2 SERPL-SCNC: 19 MMOL/L (ref 22–29)
CREAT CL PREDICTED SERPL C-G-VRATE: 0 ML/MIN (ref 70–130)
EOSINOPHIL # BLD AUTO: 0.3 THOU/UL (ref 0–0.7)
EOSINOPHIL NFR BLD AUTO: 5.8 % (ref 0–10)
GLOBULIN SER CALC-MCNC: 3 G/DL (ref 2.4–3.5)
GLUCOSE SERPL-MCNC: 89 MG/DL (ref 70–105)
HGB BLD-MCNC: 15.7 G/DL (ref 14–18)
LIPASE SERPL-CCNC: 27 U/L (ref 8–78)
LYMPHOCYTES # BLD AUTO: 1.9 THOU/UL (ref 1.2–3.4)
LYMPHOCYTES NFR BLD AUTO: 35.6 % (ref 21–51)
MCH RBC QN AUTO: 29.9 PG (ref 27–31)
MCV RBC AUTO: 90.3 FL (ref 78–98)
MONOCYTES # BLD AUTO: 0.4 THOU/UL (ref 0.11–0.59)
MONOCYTES NFR BLD AUTO: 8 % (ref 0–10)
NEUTROPHILS # BLD AUTO: 2.7 THOU/UL (ref 1.4–6.5)
NEUTROPHILS NFR BLD AUTO: 49.7 % (ref 42–75)
PLATELET # BLD AUTO: 215 THOU/UL (ref 130–400)
POTASSIUM SERPL-SCNC: 3.6 MMOL/L (ref 3.5–5.1)
RBC # BLD AUTO: 5.27 MILL/UL (ref 4.7–6.1)
SODIUM SERPL-SCNC: 138 MMOL/L (ref 136–145)
SP GR UR STRIP: 1.02 (ref 1–1.03)
WBC # BLD AUTO: 5.3 THOU/UL (ref 4.8–10.8)

## 2021-10-12 PROCEDURE — 81003 URINALYSIS AUTO W/O SCOPE: CPT

## 2021-10-12 PROCEDURE — 96374 THER/PROPH/DIAG INJ IV PUSH: CPT

## 2021-10-12 PROCEDURE — 80053 COMPREHEN METABOLIC PANEL: CPT

## 2021-10-12 PROCEDURE — 83690 ASSAY OF LIPASE: CPT

## 2021-10-12 PROCEDURE — 96375 TX/PRO/DX INJ NEW DRUG ADDON: CPT

## 2021-10-12 PROCEDURE — 85025 COMPLETE CBC W/AUTO DIFF WBC: CPT

## 2021-10-12 PROCEDURE — 74176 CT ABD & PELVIS W/O CONTRAST: CPT

## 2021-10-22 ENCOUNTER — HOSPITAL ENCOUNTER (EMERGENCY)
Dept: HOSPITAL 18 - NAV ERS | Age: 46
Discharge: HOME | End: 2021-10-22
Payer: COMMERCIAL

## 2021-10-22 DIAGNOSIS — R10.32: Primary | ICD-10-CM

## 2021-10-22 DIAGNOSIS — G89.29: ICD-10-CM

## 2021-10-22 DIAGNOSIS — F17.210: ICD-10-CM

## 2021-10-22 LAB
ALBUMIN SERPL BCG-MCNC: 4 G/DL (ref 3.5–5)
ALP SERPL-CCNC: 56 U/L (ref 40–110)
ALT SERPL W P-5'-P-CCNC: 10 U/L (ref 8–55)
ANION GAP SERPL CALC-SCNC: 12 MMOL/L (ref 10–20)
AST SERPL-CCNC: 17 U/L (ref 5–34)
BASOPHILS # BLD AUTO: 0.1 THOU/UL (ref 0–0.2)
BASOPHILS NFR BLD AUTO: 1.4 % (ref 0–1)
BILIRUB SERPL-MCNC: 0.4 MG/DL (ref 0.2–1.2)
BUN SERPL-MCNC: 14 MG/DL (ref 8.9–20.6)
CALCIUM SERPL-MCNC: 9.7 MG/DL (ref 7.8–10.44)
CHLORIDE SERPL-SCNC: 104 MMOL/L (ref 98–107)
CO2 SERPL-SCNC: 26 MMOL/L (ref 22–29)
CREAT CL PREDICTED SERPL C-G-VRATE: 0 ML/MIN (ref 70–130)
EOSINOPHIL # BLD AUTO: 0.2 THOU/UL (ref 0–0.7)
EOSINOPHIL NFR BLD AUTO: 2.4 % (ref 0–10)
GLOBULIN SER CALC-MCNC: 3.2 G/DL (ref 2.4–3.5)
GLUCOSE SERPL-MCNC: 93 MG/DL (ref 70–105)
HGB BLD-MCNC: 14.8 G/DL (ref 14–18)
LIPASE SERPL-CCNC: 27 U/L (ref 8–78)
LYMPHOCYTES # BLD AUTO: 2.6 THOU/UL (ref 1.2–3.4)
LYMPHOCYTES NFR BLD AUTO: 38.8 % (ref 21–51)
MCH RBC QN AUTO: 30.2 PG (ref 27–31)
MCV RBC AUTO: 93.3 FL (ref 78–98)
MONOCYTES # BLD AUTO: 0.6 THOU/UL (ref 0.11–0.59)
MONOCYTES NFR BLD AUTO: 8.5 % (ref 0–10)
NEUTROPHILS # BLD AUTO: 3.2 THOU/UL (ref 1.4–6.5)
NEUTROPHILS NFR BLD AUTO: 48.9 % (ref 42–75)
PLATELET # BLD AUTO: 241 THOU/UL (ref 130–400)
POTASSIUM SERPL-SCNC: 4 MMOL/L (ref 3.5–5.1)
RBC # BLD AUTO: 4.91 MILL/UL (ref 4.7–6.1)
SODIUM SERPL-SCNC: 138 MMOL/L (ref 136–145)
SP GR UR STRIP: 1.02 (ref 1–1.03)
WBC # BLD AUTO: 6.6 THOU/UL (ref 4.8–10.8)

## 2021-10-22 PROCEDURE — 96374 THER/PROPH/DIAG INJ IV PUSH: CPT

## 2021-10-22 PROCEDURE — 83690 ASSAY OF LIPASE: CPT

## 2021-10-22 PROCEDURE — 74176 CT ABD & PELVIS W/O CONTRAST: CPT

## 2021-10-22 PROCEDURE — 80053 COMPREHEN METABOLIC PANEL: CPT

## 2021-10-22 PROCEDURE — 81003 URINALYSIS AUTO W/O SCOPE: CPT

## 2021-10-22 PROCEDURE — 83605 ASSAY OF LACTIC ACID: CPT

## 2021-10-22 PROCEDURE — 96376 TX/PRO/DX INJ SAME DRUG ADON: CPT

## 2021-10-22 PROCEDURE — 96375 TX/PRO/DX INJ NEW DRUG ADDON: CPT

## 2021-10-22 PROCEDURE — 94760 N-INVAS EAR/PLS OXIMETRY 1: CPT

## 2021-10-22 PROCEDURE — 85025 COMPLETE CBC W/AUTO DIFF WBC: CPT

## 2021-11-01 ENCOUNTER — HOSPITAL ENCOUNTER (OUTPATIENT)
Dept: HOSPITAL 92 - LABBT | Age: 46
Discharge: HOME | End: 2021-11-01
Attending: SURGERY
Payer: COMMERCIAL

## 2021-11-01 DIAGNOSIS — Z20.822: ICD-10-CM

## 2021-11-01 DIAGNOSIS — K40.90: ICD-10-CM

## 2021-11-01 DIAGNOSIS — Z01.812: Primary | ICD-10-CM

## 2021-11-01 LAB
ANION GAP SERPL CALC-SCNC: 13 MMOL/L (ref 10–20)
BUN SERPL-MCNC: 16 MG/DL (ref 8.9–20.6)
CALCIUM SERPL-MCNC: 9.5 MG/DL (ref 7.8–10.44)
CHLORIDE SERPL-SCNC: 105 MMOL/L (ref 98–107)
CO2 SERPL-SCNC: 25 MMOL/L (ref 22–29)
CREAT CL PREDICTED SERPL C-G-VRATE: 0 ML/MIN (ref 70–130)
GLUCOSE SERPL-MCNC: 78 MG/DL (ref 70–105)
HGB BLD-MCNC: 15.1 G/DL (ref 13.5–17.5)
MCH RBC QN AUTO: 30 PG (ref 27–33)
MCV RBC AUTO: 88.3 FL (ref 81.2–95.1)
MDIFF COMPLETE?: YES
PLATELET # BLD AUTO: 224 10X3/UL (ref 150–450)
POTASSIUM SERPL-SCNC: 4.6 MMOL/L (ref 3.5–5.1)
RBC # BLD AUTO: 5.04 10X6/UL (ref 4.32–5.72)
SODIUM SERPL-SCNC: 138 MMOL/L (ref 136–145)
WBC # BLD AUTO: 6.7 10X3/UL (ref 3.5–10.5)

## 2021-11-01 PROCEDURE — U0005 INFEC AGEN DETEC AMPLI PROBE: HCPCS

## 2021-11-01 PROCEDURE — U0003 INFECTIOUS AGENT DETECTION BY NUCLEIC ACID (DNA OR RNA); SEVERE ACUTE RESPIRATORY SYNDROME CORONAVIRUS 2 (SARS-COV-2) (CORONAVIRUS DISEASE [COVID-19]), AMPLIFIED PROBE TECHNIQUE, MAKING USE OF HIGH THROUGHPUT TECHNOLOGIES AS DESCRIBED BY CMS-2020-01-R: HCPCS

## 2021-11-01 PROCEDURE — 80048 BASIC METABOLIC PNL TOTAL CA: CPT

## 2021-11-01 PROCEDURE — 85025 COMPLETE CBC W/AUTO DIFF WBC: CPT

## 2021-11-03 ENCOUNTER — HOSPITAL ENCOUNTER (OUTPATIENT)
Dept: HOSPITAL 92 - SDC | Age: 46
Discharge: HOME | End: 2021-11-03
Attending: SURGERY
Payer: COMMERCIAL

## 2021-11-03 VITALS — BODY MASS INDEX: 27.4 KG/M2

## 2021-11-03 DIAGNOSIS — F17.210: ICD-10-CM

## 2021-11-03 DIAGNOSIS — Z91.013: ICD-10-CM

## 2021-11-03 DIAGNOSIS — Z79.899: ICD-10-CM

## 2021-11-03 DIAGNOSIS — K40.90: Primary | ICD-10-CM

## 2021-11-03 PROCEDURE — S0020 INJECTION, BUPIVICAINE HYDRO: HCPCS

## 2021-11-03 PROCEDURE — C1781 MESH (IMPLANTABLE): HCPCS

## 2021-11-03 PROCEDURE — 0YU64JZ SUPPLEMENT LEFT INGUINAL REGION WITH SYNTHETIC SUBSTITUTE, PERCUTANEOUS ENDOSCOPIC APPROACH: ICD-10-PCS | Performed by: SURGERY

## 2021-11-14 ENCOUNTER — HOSPITAL ENCOUNTER (EMERGENCY)
Dept: HOSPITAL 18 - NAV ERS | Age: 46
Discharge: HOME | End: 2021-11-14
Payer: COMMERCIAL

## 2021-11-14 DIAGNOSIS — F17.210: ICD-10-CM

## 2021-11-14 DIAGNOSIS — G89.18: Primary | ICD-10-CM

## 2021-11-14 DIAGNOSIS — R10.32: ICD-10-CM

## 2021-11-14 DIAGNOSIS — Z79.891: ICD-10-CM

## 2021-11-14 PROCEDURE — 99283 EMERGENCY DEPT VISIT LOW MDM: CPT

## 2021-12-28 ENCOUNTER — HOSPITAL ENCOUNTER (EMERGENCY)
Dept: HOSPITAL 18 - NAV ERS | Age: 46
Discharge: HOME | End: 2021-12-28
Payer: COMMERCIAL

## 2021-12-28 DIAGNOSIS — N20.2: Primary | ICD-10-CM

## 2021-12-28 DIAGNOSIS — F17.210: ICD-10-CM

## 2021-12-28 LAB
ALBUMIN SERPL BCG-MCNC: 3.9 G/DL (ref 3.5–5)
ALP SERPL-CCNC: 68 U/L (ref 40–110)
ALT SERPL W P-5'-P-CCNC: 15 U/L (ref 8–55)
ANION GAP SERPL CALC-SCNC: 14 MMOL/L (ref 10–20)
AST SERPL-CCNC: 26 U/L (ref 5–34)
BASOPHILS # BLD AUTO: 0.1 THOU/UL (ref 0–0.2)
BASOPHILS NFR BLD AUTO: 2 % (ref 0–1)
BILIRUB SERPL-MCNC: 1.3 MG/DL (ref 0.2–1.2)
BUN SERPL-MCNC: 9 MG/DL (ref 8.9–20.6)
CALCIUM SERPL-MCNC: 9.7 MG/DL (ref 7.8–10.44)
CHLORIDE SERPL-SCNC: 101 MMOL/L (ref 98–107)
CO2 SERPL-SCNC: 26 MMOL/L (ref 22–29)
CREAT CL PREDICTED SERPL C-G-VRATE: 0 ML/MIN (ref 70–130)
EOSINOPHIL # BLD AUTO: 0 THOU/UL (ref 0–0.7)
EOSINOPHIL NFR BLD AUTO: 0.6 % (ref 0–10)
GLOBULIN SER CALC-MCNC: 3.8 G/DL (ref 2.4–3.5)
GLUCOSE SERPL-MCNC: 97 MG/DL (ref 70–105)
HGB BLD-MCNC: 15.8 G/DL (ref 14–18)
LYMPHOCYTES # BLD AUTO: 1.6 THOU/UL (ref 1.2–3.4)
LYMPHOCYTES NFR BLD AUTO: 24 % (ref 21–51)
MCH RBC QN AUTO: 30.3 PG (ref 27–31)
MCV RBC AUTO: 93.8 FL (ref 78–98)
MONOCYTES # BLD AUTO: 0.6 THOU/UL (ref 0.11–0.59)
MONOCYTES NFR BLD AUTO: 8.9 % (ref 0–10)
NEUTROPHILS # BLD AUTO: 4.3 THOU/UL (ref 1.4–6.5)
NEUTROPHILS NFR BLD AUTO: 64.4 % (ref 42–75)
PLATELET # BLD AUTO: 190 THOU/UL (ref 130–400)
POTASSIUM SERPL-SCNC: 3.5 MMOL/L (ref 3.5–5.1)
RBC # BLD AUTO: 5.23 MILL/UL (ref 4.7–6.1)
SODIUM SERPL-SCNC: 137 MMOL/L (ref 136–145)
SP GR UR STRIP: 1.01 (ref 1–1.03)
WBC # BLD AUTO: 6.7 THOU/UL (ref 4.8–10.8)

## 2021-12-28 PROCEDURE — 85025 COMPLETE CBC W/AUTO DIFF WBC: CPT

## 2021-12-28 PROCEDURE — 80053 COMPREHEN METABOLIC PANEL: CPT

## 2021-12-28 PROCEDURE — 74177 CT ABD & PELVIS W/CONTRAST: CPT

## 2021-12-28 PROCEDURE — 96375 TX/PRO/DX INJ NEW DRUG ADDON: CPT

## 2021-12-28 PROCEDURE — 81003 URINALYSIS AUTO W/O SCOPE: CPT

## 2021-12-28 PROCEDURE — 96374 THER/PROPH/DIAG INJ IV PUSH: CPT

## 2021-12-28 PROCEDURE — 36415 COLL VENOUS BLD VENIPUNCTURE: CPT

## 2021-12-31 ENCOUNTER — HOSPITAL ENCOUNTER (EMERGENCY)
Dept: HOSPITAL 18 - NAV ERS | Age: 46
Discharge: HOME | End: 2021-12-31
Payer: COMMERCIAL

## 2021-12-31 DIAGNOSIS — F17.210: ICD-10-CM

## 2021-12-31 DIAGNOSIS — R10.32: Primary | ICD-10-CM

## 2021-12-31 LAB
ALBUMIN SERPL BCG-MCNC: 3.9 G/DL (ref 3.5–5)
ALP SERPL-CCNC: 57 U/L (ref 40–110)
ALT SERPL W P-5'-P-CCNC: 11 U/L (ref 8–55)
ANION GAP SERPL CALC-SCNC: 14 MMOL/L (ref 10–20)
AST SERPL-CCNC: 17 U/L (ref 5–34)
BILIRUB SERPL-MCNC: 0.4 MG/DL (ref 0.2–1.2)
BUN SERPL-MCNC: 10 MG/DL (ref 8.9–20.6)
CALCIUM SERPL-MCNC: 9.7 MG/DL (ref 7.8–10.44)
CHLORIDE SERPL-SCNC: 101 MMOL/L (ref 98–107)
CO2 SERPL-SCNC: 25 MMOL/L (ref 22–29)
CREAT CL PREDICTED SERPL C-G-VRATE: 0 ML/MIN (ref 70–130)
GLOBULIN SER CALC-MCNC: 3.6 G/DL (ref 2.4–3.5)
GLUCOSE SERPL-MCNC: 83 MG/DL (ref 70–105)
HGB BLD-MCNC: 15.1 G/DL (ref 14–18)
LIPASE SERPL-CCNC: 29 U/L (ref 8–78)
MCH RBC QN AUTO: 30.1 PG (ref 27–31)
MCV RBC AUTO: 93.9 FL (ref 78–98)
MDIFF COMPLETE?: YES
PLATELET # BLD AUTO: 193 THOU/UL (ref 130–400)
POTASSIUM SERPL-SCNC: 3.8 MMOL/L (ref 3.5–5.1)
RBC # BLD AUTO: 5 MILL/UL (ref 4.7–6.1)
SODIUM SERPL-SCNC: 136 MMOL/L (ref 136–145)
SP GR UR STRIP: 1.02 (ref 1–1.03)
WBC # BLD AUTO: 6.9 THOU/UL (ref 4.8–10.8)

## 2021-12-31 PROCEDURE — 85025 COMPLETE CBC W/AUTO DIFF WBC: CPT

## 2021-12-31 PROCEDURE — 96374 THER/PROPH/DIAG INJ IV PUSH: CPT

## 2021-12-31 PROCEDURE — 80053 COMPREHEN METABOLIC PANEL: CPT

## 2021-12-31 PROCEDURE — 83690 ASSAY OF LIPASE: CPT

## 2021-12-31 PROCEDURE — 81003 URINALYSIS AUTO W/O SCOPE: CPT

## 2021-12-31 PROCEDURE — 96375 TX/PRO/DX INJ NEW DRUG ADDON: CPT

## 2022-01-09 ENCOUNTER — HOSPITAL ENCOUNTER (EMERGENCY)
Dept: HOSPITAL 18 - NAV ERS | Age: 47
Discharge: HOME | End: 2022-01-09
Payer: COMMERCIAL

## 2022-01-09 DIAGNOSIS — R10.32: Primary | ICD-10-CM

## 2022-01-09 DIAGNOSIS — F17.210: ICD-10-CM

## 2022-01-09 LAB
ALBUMIN SERPL BCG-MCNC: 4 G/DL (ref 3.5–5)
ALP SERPL-CCNC: 54 U/L (ref 40–110)
ALT SERPL W P-5'-P-CCNC: 13 U/L (ref 8–55)
ANION GAP SERPL CALC-SCNC: 12 MMOL/L (ref 10–20)
AST SERPL-CCNC: 16 U/L (ref 5–34)
BASOPHILS # BLD AUTO: 0.1 THOU/UL (ref 0–0.2)
BASOPHILS NFR BLD AUTO: 2.1 % (ref 0–1)
BILIRUB SERPL-MCNC: 0.5 MG/DL (ref 0.2–1.2)
BUN SERPL-MCNC: 18 MG/DL (ref 8.9–20.6)
CALCIUM SERPL-MCNC: 9.2 MG/DL (ref 7.8–10.44)
CHLORIDE SERPL-SCNC: 103 MMOL/L (ref 98–107)
CO2 SERPL-SCNC: 23 MMOL/L (ref 22–29)
CREAT CL PREDICTED SERPL C-G-VRATE: 0 ML/MIN (ref 70–130)
EOSINOPHIL # BLD AUTO: 0.1 THOU/UL (ref 0–0.7)
EOSINOPHIL NFR BLD AUTO: 2.7 % (ref 0–10)
GLOBULIN SER CALC-MCNC: 3.2 G/DL (ref 2.4–3.5)
GLUCOSE SERPL-MCNC: 96 MG/DL (ref 70–105)
HGB BLD-MCNC: 15.7 G/DL (ref 14–18)
LIPASE SERPL-CCNC: 23 U/L (ref 8–78)
LYMPHOCYTES # BLD AUTO: 2 THOU/UL (ref 1.2–3.4)
LYMPHOCYTES NFR BLD AUTO: 39.9 % (ref 21–51)
MCH RBC QN AUTO: 29.8 PG (ref 27–31)
MCV RBC AUTO: 92.8 FL (ref 78–98)
MONOCYTES # BLD AUTO: 0.5 THOU/UL (ref 0.11–0.59)
MONOCYTES NFR BLD AUTO: 8.9 % (ref 0–10)
NEUTROPHILS # BLD AUTO: 2.4 THOU/UL (ref 1.4–6.5)
NEUTROPHILS NFR BLD AUTO: 46.5 % (ref 42–75)
PLATELET # BLD AUTO: 211 THOU/UL (ref 130–400)
POTASSIUM SERPL-SCNC: 4.3 MMOL/L (ref 3.5–5.1)
RBC # BLD AUTO: 5.26 MILL/UL (ref 4.7–6.1)
RBC UR QL AUTO: (no result) HPF (ref 0–3)
SODIUM SERPL-SCNC: 134 MMOL/L (ref 136–145)
SP GR UR STRIP: 1.02 (ref 1–1.03)
WBC # BLD AUTO: 5.1 THOU/UL (ref 4.8–10.8)

## 2022-01-09 PROCEDURE — 96375 TX/PRO/DX INJ NEW DRUG ADDON: CPT

## 2022-01-09 PROCEDURE — 81015 MICROSCOPIC EXAM OF URINE: CPT

## 2022-01-09 PROCEDURE — 83690 ASSAY OF LIPASE: CPT

## 2022-01-09 PROCEDURE — 74176 CT ABD & PELVIS W/O CONTRAST: CPT

## 2022-01-09 PROCEDURE — 85025 COMPLETE CBC W/AUTO DIFF WBC: CPT

## 2022-01-09 PROCEDURE — 80053 COMPREHEN METABOLIC PANEL: CPT

## 2022-01-09 PROCEDURE — 81003 URINALYSIS AUTO W/O SCOPE: CPT

## 2022-01-09 PROCEDURE — 96374 THER/PROPH/DIAG INJ IV PUSH: CPT

## 2022-03-20 ENCOUNTER — HOSPITAL ENCOUNTER (EMERGENCY)
Dept: HOSPITAL 18 - NAV ERS | Age: 47
Discharge: ANOTHER HOSPITAL | End: 2022-03-20
Payer: COMMERCIAL

## 2022-03-20 DIAGNOSIS — N50.82: Primary | ICD-10-CM

## 2022-03-20 DIAGNOSIS — F17.210: ICD-10-CM

## 2022-03-20 DIAGNOSIS — R10.32: ICD-10-CM

## 2022-03-20 LAB
ALBUMIN SERPL BCG-MCNC: 4.1 G/DL (ref 3.5–5)
ALP SERPL-CCNC: 65 U/L (ref 40–110)
ALT SERPL W P-5'-P-CCNC: 11 U/L (ref 8–55)
ANION GAP SERPL CALC-SCNC: 13 MMOL/L (ref 10–20)
AST SERPL-CCNC: 19 U/L (ref 5–34)
BASOPHILS # BLD AUTO: 0.1 THOU/UL (ref 0–0.2)
BASOPHILS NFR BLD AUTO: 1.4 % (ref 0–1)
BILIRUB SERPL-MCNC: 0.8 MG/DL (ref 0.2–1.2)
BUN SERPL-MCNC: 18 MG/DL (ref 8.9–20.6)
CALCIUM SERPL-MCNC: 9.3 MG/DL (ref 7.8–10.44)
CHLORIDE SERPL-SCNC: 104 MMOL/L (ref 98–107)
CO2 SERPL-SCNC: 25 MMOL/L (ref 22–29)
CREAT CL PREDICTED SERPL C-G-VRATE: 0 ML/MIN (ref 70–130)
EOSINOPHIL # BLD AUTO: 0.2 THOU/UL (ref 0–0.7)
EOSINOPHIL NFR BLD AUTO: 4 % (ref 0–10)
GLOBULIN SER CALC-MCNC: 3.2 G/DL (ref 2.4–3.5)
GLUCOSE SERPL-MCNC: 74 MG/DL (ref 70–105)
HGB BLD-MCNC: 16.3 G/DL (ref 14–18)
LIPASE SERPL-CCNC: 26 U/L (ref 8–78)
LYMPHOCYTES # BLD AUTO: 1.7 THOU/UL (ref 1.2–3.4)
LYMPHOCYTES NFR BLD AUTO: 32.9 % (ref 21–51)
MCH RBC QN AUTO: 29.6 PG (ref 27–31)
MCV RBC AUTO: 92.7 FL (ref 78–98)
MONOCYTES # BLD AUTO: 0.5 THOU/UL (ref 0.11–0.59)
MONOCYTES NFR BLD AUTO: 9.6 % (ref 0–10)
NEUTROPHILS # BLD AUTO: 2.7 THOU/UL (ref 1.4–6.5)
NEUTROPHILS NFR BLD AUTO: 52 % (ref 42–75)
PLATELET # BLD AUTO: 225 THOU/UL (ref 130–400)
POTASSIUM SERPL-SCNC: 3.8 MMOL/L (ref 3.5–5.1)
RBC # BLD AUTO: 5.51 MILL/UL (ref 4.7–6.1)
RBC UR QL AUTO: (no result) HPF (ref 0–3)
SODIUM SERPL-SCNC: 138 MMOL/L (ref 136–145)
SP GR UR STRIP: 1.03 (ref 1–1.04)
WBC # BLD AUTO: 5.2 THOU/UL (ref 4.8–10.8)
WBC UR QL AUTO: (no result) HPF (ref 0–3)

## 2022-03-20 PROCEDURE — 96374 THER/PROPH/DIAG INJ IV PUSH: CPT

## 2022-03-20 PROCEDURE — 96375 TX/PRO/DX INJ NEW DRUG ADDON: CPT

## 2022-03-20 PROCEDURE — 87491 CHLMYD TRACH DNA AMP PROBE: CPT

## 2022-03-20 PROCEDURE — 87591 N.GONORRHOEAE DNA AMP PROB: CPT

## 2022-03-20 PROCEDURE — 80053 COMPREHEN METABOLIC PANEL: CPT

## 2022-03-20 PROCEDURE — 85025 COMPLETE CBC W/AUTO DIFF WBC: CPT

## 2022-03-20 PROCEDURE — 83690 ASSAY OF LIPASE: CPT

## 2022-03-20 PROCEDURE — 81015 MICROSCOPIC EXAM OF URINE: CPT

## 2022-03-20 PROCEDURE — 81003 URINALYSIS AUTO W/O SCOPE: CPT

## 2022-05-19 ENCOUNTER — HOSPITAL ENCOUNTER (EMERGENCY)
Dept: HOSPITAL 92 - ERS | Age: 47
Discharge: HOME | End: 2022-05-19
Payer: COMMERCIAL

## 2022-05-19 DIAGNOSIS — F17.210: ICD-10-CM

## 2022-05-19 DIAGNOSIS — R10.30: Primary | ICD-10-CM

## 2022-05-19 LAB
ALBUMIN SERPL BCG-MCNC: 3.4 G/DL (ref 3.5–5)
ALP SERPL-CCNC: 55 U/L (ref 40–110)
ALT SERPL W P-5'-P-CCNC: 11 U/L (ref 8–55)
ANION GAP SERPL CALC-SCNC: 12 MMOL/L (ref 10–20)
AST SERPL-CCNC: 15 U/L (ref 5–34)
BASOPHILS # BLD AUTO: 0.1 THOU/UL (ref 0–0.2)
BASOPHILS NFR BLD AUTO: 1.5 % (ref 0–1)
BILIRUB SERPL-MCNC: 0.4 MG/DL (ref 0.2–1.2)
BUN SERPL-MCNC: 7 MG/DL (ref 8.9–20.6)
CALCIUM SERPL-MCNC: 8.6 MG/DL (ref 7.8–10.44)
CHLORIDE SERPL-SCNC: 105 MMOL/L (ref 98–107)
CO2 SERPL-SCNC: 23 MMOL/L (ref 22–29)
CREAT CL PREDICTED SERPL C-G-VRATE: 0 ML/MIN (ref 70–130)
EOSINOPHIL # BLD AUTO: 0.3 THOU/UL (ref 0–0.7)
EOSINOPHIL NFR BLD AUTO: 4.7 % (ref 0–10)
GLOBULIN SER CALC-MCNC: 2.7 G/DL (ref 2.4–3.5)
GLUCOSE SERPL-MCNC: 99 MG/DL (ref 70–105)
HGB BLD-MCNC: 13.8 G/DL (ref 14–18)
LYMPHOCYTES # BLD: 2.6 THOU/UL (ref 1.2–3.4)
LYMPHOCYTES NFR BLD AUTO: 48 % (ref 21–51)
MCH RBC QN AUTO: 30.5 PG (ref 27–31)
MCV RBC AUTO: 93.8 FL (ref 78–98)
MONOCYTES # BLD AUTO: 0.5 THOU/UL (ref 0.11–0.59)
MONOCYTES NFR BLD AUTO: 9.5 % (ref 0–10)
NEUTROPHILS # BLD AUTO: 2 THOU/UL (ref 1.4–6.5)
NEUTROPHILS NFR BLD AUTO: 36.3 % (ref 42–75)
PLATELET # BLD AUTO: 175 THOU/UL (ref 130–400)
POTASSIUM SERPL-SCNC: 3.4 MMOL/L (ref 3.5–5.1)
RBC # BLD AUTO: 4.51 MILL/UL (ref 4.7–6.1)
SODIUM SERPL-SCNC: 137 MMOL/L (ref 136–145)
WBC # BLD AUTO: 5.5 THOU/UL (ref 4.8–10.8)

## 2022-05-19 PROCEDURE — 85025 COMPLETE CBC W/AUTO DIFF WBC: CPT

## 2022-05-19 PROCEDURE — 80053 COMPREHEN METABOLIC PANEL: CPT

## 2022-05-19 PROCEDURE — 74177 CT ABD & PELVIS W/CONTRAST: CPT

## 2022-05-19 PROCEDURE — 36415 COLL VENOUS BLD VENIPUNCTURE: CPT

## 2022-05-19 PROCEDURE — 96374 THER/PROPH/DIAG INJ IV PUSH: CPT

## 2022-07-04 ENCOUNTER — HOSPITAL ENCOUNTER (EMERGENCY)
Dept: HOSPITAL 18 - NAV ERS | Age: 47
Discharge: HOME | End: 2022-07-04
Payer: COMMERCIAL

## 2022-07-04 DIAGNOSIS — F17.200: ICD-10-CM

## 2022-07-04 DIAGNOSIS — R10.32: Primary | ICD-10-CM

## 2022-07-04 LAB
ALBUMIN SERPL BCG-MCNC: 3.8 G/DL (ref 3.5–5)
ALP SERPL-CCNC: 68 U/L (ref 40–110)
ALT SERPL W P-5'-P-CCNC: 11 U/L (ref 8–55)
ANION GAP SERPL CALC-SCNC: 17 MMOL/L (ref 10–20)
AST SERPL-CCNC: 13 U/L (ref 5–34)
BASOPHILS # BLD AUTO: 0.1 THOU/UL (ref 0–0.2)
BASOPHILS NFR BLD AUTO: 1.3 % (ref 0–1)
BILIRUB SERPL-MCNC: 0.7 MG/DL (ref 0.2–1.2)
BUN SERPL-MCNC: 10 MG/DL (ref 8.9–20.6)
CALCIUM SERPL-MCNC: 9.4 MG/DL (ref 7.8–10.44)
CHLORIDE SERPL-SCNC: 104 MMOL/L (ref 98–107)
CK SERPL-CCNC: 126 U/L (ref 30–200)
CO2 SERPL-SCNC: 21 MMOL/L (ref 22–29)
CREAT CL PREDICTED SERPL C-G-VRATE: 0 ML/MIN (ref 70–130)
EOSINOPHIL # BLD AUTO: 0.2 THOU/UL (ref 0–0.7)
EOSINOPHIL NFR BLD AUTO: 3.5 % (ref 0–10)
GLOBULIN SER CALC-MCNC: 2.8 G/DL (ref 2.4–3.5)
GLUCOSE SERPL-MCNC: 112 MG/DL (ref 70–105)
HGB BLD-MCNC: 15.2 G/DL (ref 14–18)
LYMPHOCYTES # BLD AUTO: 2.5 THOU/UL (ref 1.2–3.4)
LYMPHOCYTES NFR BLD AUTO: 43.4 % (ref 21–51)
MCH RBC QN AUTO: 29.9 PG (ref 27–31)
MCV RBC AUTO: 93.8 FL (ref 78–98)
MONOCYTES # BLD AUTO: 0.5 THOU/UL (ref 0.11–0.59)
MONOCYTES NFR BLD AUTO: 7.8 % (ref 0–10)
NEUTROPHILS # BLD AUTO: 2.5 THOU/UL (ref 1.4–6.5)
NEUTROPHILS NFR BLD AUTO: 44.1 % (ref 42–75)
PLATELET # BLD AUTO: 200 THOU/UL (ref 130–400)
POTASSIUM SERPL-SCNC: 3.6 MMOL/L (ref 3.5–5.1)
RBC # BLD AUTO: 5.1 MILL/UL (ref 4.7–6.1)
SODIUM SERPL-SCNC: 138 MMOL/L (ref 136–145)
SP GR UR STRIP: 1.01 (ref 1–1.03)
WBC # BLD AUTO: 5.8 THOU/UL (ref 4.8–10.8)

## 2022-07-04 PROCEDURE — 96361 HYDRATE IV INFUSION ADD-ON: CPT

## 2022-07-04 PROCEDURE — 74177 CT ABD & PELVIS W/CONTRAST: CPT

## 2022-07-04 PROCEDURE — 80053 COMPREHEN METABOLIC PANEL: CPT

## 2022-07-04 PROCEDURE — 82550 ASSAY OF CK (CPK): CPT

## 2022-07-04 PROCEDURE — 81003 URINALYSIS AUTO W/O SCOPE: CPT

## 2022-07-04 PROCEDURE — 96375 TX/PRO/DX INJ NEW DRUG ADDON: CPT

## 2022-07-04 PROCEDURE — 85025 COMPLETE CBC W/AUTO DIFF WBC: CPT

## 2022-07-04 PROCEDURE — 96374 THER/PROPH/DIAG INJ IV PUSH: CPT

## 2022-07-04 PROCEDURE — 83605 ASSAY OF LACTIC ACID: CPT

## 2022-09-06 ENCOUNTER — HOSPITAL ENCOUNTER (EMERGENCY)
Dept: HOSPITAL 18 - NAV ERS | Age: 47
Discharge: HOME | End: 2022-09-06
Payer: COMMERCIAL

## 2022-09-06 DIAGNOSIS — R10.32: Primary | ICD-10-CM

## 2022-09-06 DIAGNOSIS — F17.200: ICD-10-CM

## 2022-09-06 PROCEDURE — 99281 EMR DPT VST MAYX REQ PHY/QHP: CPT

## 2022-09-07 ENCOUNTER — HOSPITAL ENCOUNTER (EMERGENCY)
Dept: HOSPITAL 92 - CSHERS | Age: 47
LOS: 1 days | Discharge: HOME | End: 2022-09-08
Payer: COMMERCIAL

## 2022-09-07 DIAGNOSIS — R10.9: Primary | ICD-10-CM

## 2022-09-07 DIAGNOSIS — F17.200: ICD-10-CM

## 2022-09-07 LAB
ALBUMIN SERPL BCG-MCNC: 3.7 G/DL (ref 3.5–5)
ALP SERPL-CCNC: 64 U/L (ref 40–110)
ALT SERPL W P-5'-P-CCNC: 9 U/L (ref 8–55)
ANION GAP SERPL CALC-SCNC: 13 MMOL/L (ref 10–20)
AST SERPL-CCNC: 14 U/L (ref 5–34)
BASOPHILS # BLD AUTO: 0 10X3/UL (ref 0–0.2)
BASOPHILS NFR BLD AUTO: 0.6 % (ref 0–2)
BILIRUB SERPL-MCNC: 0.4 MG/DL (ref 0.2–1.2)
BUN SERPL-MCNC: 15 MG/DL (ref 8.9–20.6)
CALCIUM SERPL-MCNC: 9.4 MG/DL (ref 7.8–10.44)
CHLORIDE SERPL-SCNC: 108 MMOL/L (ref 98–107)
CO2 SERPL-SCNC: 18 MMOL/L (ref 22–29)
CREAT CL PREDICTED SERPL C-G-VRATE: 0 ML/MIN (ref 70–130)
EOSINOPHIL # BLD AUTO: 0.3 10X3/UL (ref 0–0.5)
EOSINOPHIL NFR BLD AUTO: 3.9 % (ref 0–6)
GLOBULIN SER CALC-MCNC: 3 G/DL (ref 2.4–3.5)
GLUCOSE SERPL-MCNC: 97 MG/DL (ref 70–105)
HGB BLD-MCNC: 13.8 G/DL (ref 13.5–17.5)
LYMPHOCYTES NFR BLD AUTO: 49.3 % (ref 18–47)
MCH RBC QN AUTO: 29.9 PG (ref 27–33)
MCV RBC AUTO: 84.2 FL (ref 81.2–95.1)
MONOCYTES # BLD AUTO: 0.7 10X3/UL (ref 0–1.1)
MONOCYTES NFR BLD AUTO: 10.1 % (ref 0–10)
NEUTROPHILS # BLD AUTO: 2.5 10X3/UL (ref 1.5–8.4)
NEUTROPHILS NFR BLD AUTO: 36 % (ref 40–75)
PLATELET # BLD AUTO: 187 10X3/UL (ref 150–450)
POTASSIUM SERPL-SCNC: 3.6 MMOL/L (ref 3.5–5.1)
RBC # BLD AUTO: 4.62 10X6/UL (ref 4.32–5.72)
SODIUM SERPL-SCNC: 135 MMOL/L (ref 136–145)
SP GR UR STRIP: 1.01 (ref 1–1.04)
WBC # BLD AUTO: 6.8 10X3/UL (ref 3.5–10.5)

## 2022-09-07 PROCEDURE — 74176 CT ABD & PELVIS W/O CONTRAST: CPT

## 2022-09-07 PROCEDURE — 96375 TX/PRO/DX INJ NEW DRUG ADDON: CPT

## 2022-09-07 PROCEDURE — 96361 HYDRATE IV INFUSION ADD-ON: CPT

## 2022-09-07 PROCEDURE — 81003 URINALYSIS AUTO W/O SCOPE: CPT

## 2022-09-07 PROCEDURE — 80053 COMPREHEN METABOLIC PANEL: CPT

## 2022-09-07 PROCEDURE — 85025 COMPLETE CBC W/AUTO DIFF WBC: CPT

## 2022-09-07 PROCEDURE — 96374 THER/PROPH/DIAG INJ IV PUSH: CPT

## 2022-10-12 ENCOUNTER — HOSPITAL ENCOUNTER (EMERGENCY)
Dept: HOSPITAL 18 - NAV ERS | Age: 47
Discharge: HOME | End: 2022-10-12
Payer: COMMERCIAL

## 2022-10-12 DIAGNOSIS — R10.32: Primary | ICD-10-CM

## 2022-10-12 DIAGNOSIS — F17.210: ICD-10-CM

## 2022-10-12 PROCEDURE — 99284 EMERGENCY DEPT VISIT MOD MDM: CPT

## 2022-11-21 ENCOUNTER — HOSPITAL ENCOUNTER (EMERGENCY)
Dept: HOSPITAL 18 - NAV ERS | Age: 47
Discharge: HOME | End: 2022-11-21
Payer: COMMERCIAL

## 2022-11-21 DIAGNOSIS — F17.210: ICD-10-CM

## 2022-11-21 DIAGNOSIS — J06.9: Primary | ICD-10-CM

## 2022-11-21 PROCEDURE — 87804 INFLUENZA ASSAY W/OPTIC: CPT

## 2022-11-21 PROCEDURE — 99283 EMERGENCY DEPT VISIT LOW MDM: CPT

## 2022-12-04 ENCOUNTER — HOSPITAL ENCOUNTER (EMERGENCY)
Dept: HOSPITAL 18 - NAV ERS | Age: 47
Discharge: HOME | End: 2022-12-04
Payer: COMMERCIAL

## 2022-12-04 DIAGNOSIS — R10.32: ICD-10-CM

## 2022-12-04 DIAGNOSIS — F17.210: ICD-10-CM

## 2022-12-04 DIAGNOSIS — G89.29: ICD-10-CM

## 2022-12-04 DIAGNOSIS — R10.84: Primary | ICD-10-CM

## 2022-12-04 PROCEDURE — 99283 EMERGENCY DEPT VISIT LOW MDM: CPT

## 2023-08-07 ENCOUNTER — HOSPITAL ENCOUNTER (EMERGENCY)
Dept: HOSPITAL 18 - NAV ERS | Age: 48
Discharge: HOME | End: 2023-08-07
Payer: COMMERCIAL

## 2023-08-07 DIAGNOSIS — F17.210: ICD-10-CM

## 2023-08-07 DIAGNOSIS — R10.32: Primary | ICD-10-CM

## 2023-08-07 PROCEDURE — 99283 EMERGENCY DEPT VISIT LOW MDM: CPT

## 2023-09-12 ENCOUNTER — HOSPITAL ENCOUNTER (EMERGENCY)
Dept: HOSPITAL 18 - NAV ERS | Age: 48
Discharge: HOME | End: 2023-09-12
Payer: COMMERCIAL

## 2023-09-12 DIAGNOSIS — G89.29: ICD-10-CM

## 2023-09-12 DIAGNOSIS — F17.210: ICD-10-CM

## 2023-09-12 DIAGNOSIS — R10.32: Primary | ICD-10-CM

## 2023-09-12 LAB
CAUTI INDICATIONS FOR CULTURE: (no result)
RBC UR QL AUTO: (no result) HPF (ref 0–3)
SP GR UR STRIP: 1.02 (ref 1–1.03)
WBC UR QL AUTO: (no result) HPF (ref 0–3)

## 2023-09-12 PROCEDURE — 81001 URINALYSIS AUTO W/SCOPE: CPT

## 2023-09-12 PROCEDURE — 74176 CT ABD & PELVIS W/O CONTRAST: CPT

## 2023-10-09 ENCOUNTER — HOSPITAL ENCOUNTER (EMERGENCY)
Dept: HOSPITAL 18 - NAV ERS | Age: 48
Discharge: HOME | End: 2023-10-09
Payer: COMMERCIAL

## 2023-10-09 DIAGNOSIS — F17.210: ICD-10-CM

## 2023-10-09 DIAGNOSIS — G89.29: ICD-10-CM

## 2023-10-09 DIAGNOSIS — R10.32: Primary | ICD-10-CM

## 2023-10-09 LAB
ANION GAP SERPL CALC-SCNC: 14 MMOL/L (ref 10–20)
BASOPHILS # BLD AUTO: 0.1 THOU/UL (ref 0–0.2)
BASOPHILS NFR BLD AUTO: 1.3 % (ref 0–1)
BUN SERPL-MCNC: 12 MG/DL (ref 8.9–20.6)
CALCIUM SERPL-MCNC: 9.7 MG/DL (ref 7.8–10.44)
CAUTI INDICATIONS FOR CULTURE: (no result)
CHLORIDE SERPL-SCNC: 103 MMOL/L (ref 98–107)
CO2 SERPL-SCNC: 22 MMOL/L (ref 22–29)
CREAT CL PREDICTED SERPL C-G-VRATE: 0 ML/MIN (ref 70–130)
EOSINOPHIL # BLD AUTO: 0.2 THOU/UL (ref 0–0.7)
EOSINOPHIL NFR BLD AUTO: 3.5 % (ref 0–10)
GLUCOSE SERPL-MCNC: 96 MG/DL (ref 70–105)
HCT VFR BLD CALC: 48 % (ref 42–52)
HGB BLD-MCNC: 16.1 G/DL (ref 14–18)
LYMPHOCYTES # BLD AUTO: 3.2 THOU/UL (ref 1.2–3.4)
LYMPHOCYTES NFR BLD AUTO: 50.5 % (ref 21–51)
MCH RBC QN AUTO: 30.9 PG (ref 27–31)
MCV RBC AUTO: 92 FL (ref 78–98)
MONOCYTES # BLD AUTO: 0.5 THOU/UL (ref 0.11–0.59)
MONOCYTES NFR BLD AUTO: 7.4 % (ref 0–10)
NEUTROPHILS # BLD AUTO: 2.4 THOU/UL (ref 1.4–6.5)
NEUTROPHILS NFR BLD AUTO: 37.4 % (ref 42–75)
PLATELET # BLD AUTO: 210 10X3/UL (ref 130–400)
POTASSIUM SERPL-SCNC: 4.2 MMOL/L (ref 3.5–5.1)
RBC # BLD AUTO: 5.22 MILL/UL (ref 4.7–6.1)
SODIUM SERPL-SCNC: 135 MMOL/L (ref 136–145)
SP GR UR STRIP: 1.01 (ref 1–1.03)
WBC # BLD AUTO: 6.4 10X3/UL (ref 4.8–10.8)

## 2023-10-09 PROCEDURE — 96375 TX/PRO/DX INJ NEW DRUG ADDON: CPT

## 2023-10-09 PROCEDURE — 96374 THER/PROPH/DIAG INJ IV PUSH: CPT

## 2023-10-09 PROCEDURE — 81001 URINALYSIS AUTO W/SCOPE: CPT

## 2023-10-09 PROCEDURE — 80048 BASIC METABOLIC PNL TOTAL CA: CPT

## 2023-10-09 PROCEDURE — 85025 COMPLETE CBC W/AUTO DIFF WBC: CPT

## 2024-12-16 ENCOUNTER — HOSPITAL ENCOUNTER (EMERGENCY)
Dept: HOSPITAL 18 - NAV ERS | Age: 49
Discharge: HOME | End: 2024-12-16
Payer: COMMERCIAL

## 2024-12-16 DIAGNOSIS — T78.40XA: Primary | ICD-10-CM

## 2024-12-16 DIAGNOSIS — K04.7: ICD-10-CM

## 2024-12-16 DIAGNOSIS — K59.00: ICD-10-CM

## 2024-12-16 DIAGNOSIS — F17.210: ICD-10-CM

## 2024-12-16 DIAGNOSIS — K62.89: ICD-10-CM

## 2024-12-16 DIAGNOSIS — R03.0: ICD-10-CM

## 2024-12-16 PROCEDURE — 82274 ASSAY TEST FOR BLOOD FECAL: CPT

## 2024-12-16 PROCEDURE — 99283 EMERGENCY DEPT VISIT LOW MDM: CPT

## 2025-01-21 ENCOUNTER — HOSPITAL ENCOUNTER (EMERGENCY)
Dept: HOSPITAL 18 - NAV ERS | Age: 50
Discharge: HOME | End: 2025-01-21
Payer: COMMERCIAL

## 2025-01-21 DIAGNOSIS — B34.9: Primary | ICD-10-CM

## 2025-01-21 DIAGNOSIS — F17.290: ICD-10-CM

## 2025-01-21 PROCEDURE — 87428 SARSCOV & INF VIR A&B AG IA: CPT

## 2025-01-21 PROCEDURE — 99283 EMERGENCY DEPT VISIT LOW MDM: CPT

## 2025-02-20 ENCOUNTER — HOSPITAL ENCOUNTER (EMERGENCY)
Dept: HOSPITAL 18 - NAV ERS | Age: 50
Discharge: HOME | End: 2025-02-20
Payer: COMMERCIAL

## 2025-02-20 DIAGNOSIS — R10.32: Primary | ICD-10-CM

## 2025-02-20 DIAGNOSIS — F17.290: ICD-10-CM

## 2025-02-20 LAB
ALBUMIN SERPL BCG-MCNC: 4.1 G/DL (ref 3.1–4.5)
ALP SERPL-CCNC: 67 U/L (ref 40–110)
ALT SERPL W P-5'-P-CCNC: 22 U/L (ref ?–45)
ANION GAP SERPL CALC-SCNC: 13 MMOL/L (ref 10–20)
AST SERPL-CCNC: 23 U/L (ref 11–34)
BASOPHILS # BLD AUTO: 0.1 THOU/UL (ref 0–0.2)
BASOPHILS NFR BLD AUTO: 1.2 % (ref 0–1)
BILIRUB SERPL-MCNC: 0.3 MG/DL (ref 0.3–1.2)
BUN SERPL-MCNC: 14 MG/DL (ref 8.9–20.6)
CALCIUM SERPL-MCNC: 10.2 MG/DL (ref 7.8–10.44)
CAUTI INDICATIONS FOR CULTURE: (no result)
CHLORIDE SERPL-SCNC: 105 MMOL/L (ref 98–107)
CO2 SERPL-SCNC: 27 MMOL/L (ref 22–29)
CREAT CL PREDICTED SERPL C-G-VRATE: 0 ML/MIN (ref 70–130)
EOSINOPHIL # BLD AUTO: 0.3 THOU/UL (ref 0–0.7)
EOSINOPHIL NFR BLD AUTO: 4.3 % (ref 0–10)
GLOBULIN SER CALC-MCNC: 3.5 G/DL (ref 2.4–3.5)
GLUCOSE SERPL-MCNC: 100 MG/DL (ref 70–105)
HCT VFR BLD CALC: 48.9 % (ref 42–52)
HGB BLD-MCNC: 15.4 G/DL (ref 14–18)
LYMPHOCYTES # BLD AUTO: 3.3 THOU/UL (ref 1.2–3.4)
LYMPHOCYTES NFR BLD AUTO: 45.8 % (ref 21–51)
MCH RBC QN AUTO: 27.9 PG (ref 27–31)
MCV RBC AUTO: 88.6 FL (ref 78–98)
MONOCYTES # BLD AUTO: 0.7 THOU/UL (ref 0.11–0.59)
MONOCYTES NFR BLD AUTO: 9.9 % (ref 0–10)
NEUTROPHILS # BLD AUTO: 2.8 THOU/UL (ref 1.4–6.5)
NEUTROPHILS NFR BLD AUTO: 38.8 % (ref 42–75)
PLATELET # BLD AUTO: 186 10X3/UL (ref 130–400)
POTASSIUM SERPL-SCNC: 4.4 MMOL/L (ref 3.5–5.1)
RBC # BLD AUTO: 5.52 MILL/UL (ref 4.7–6.1)
RBC UR QL AUTO: (no result) HPF (ref 0–3)
SODIUM SERPL-SCNC: 141 MMOL/L (ref 136–145)
SP GR UR STRIP: 1.01 (ref 1–1.03)
TROPONIN I SERPL DL<=0.01 NG/ML-MCNC: (no result) NG/ML (ref ?–0.03)
WBC # BLD AUTO: 7.2 10X3/UL (ref 4.8–10.8)
WBC UR QL AUTO: (no result) HPF (ref 0–3)

## 2025-02-20 PROCEDURE — 96361 HYDRATE IV INFUSION ADD-ON: CPT

## 2025-02-20 PROCEDURE — 74177 CT ABD & PELVIS W/CONTRAST: CPT

## 2025-02-20 PROCEDURE — 81001 URINALYSIS AUTO W/SCOPE: CPT

## 2025-02-20 PROCEDURE — 93005 ELECTROCARDIOGRAM TRACING: CPT

## 2025-02-20 PROCEDURE — 80053 COMPREHEN METABOLIC PANEL: CPT

## 2025-02-20 PROCEDURE — 84484 ASSAY OF TROPONIN QUANT: CPT

## 2025-02-20 PROCEDURE — 96375 TX/PRO/DX INJ NEW DRUG ADDON: CPT

## 2025-02-20 PROCEDURE — 83605 ASSAY OF LACTIC ACID: CPT

## 2025-02-20 PROCEDURE — 96374 THER/PROPH/DIAG INJ IV PUSH: CPT

## 2025-02-20 PROCEDURE — 85025 COMPLETE CBC W/AUTO DIFF WBC: CPT
